# Patient Record
Sex: FEMALE | Race: WHITE | Employment: FULL TIME | URBAN - NONMETROPOLITAN AREA
[De-identification: names, ages, dates, MRNs, and addresses within clinical notes are randomized per-mention and may not be internally consistent; named-entity substitution may affect disease eponyms.]

---

## 2020-09-09 ENCOUNTER — OFFICE VISIT (OUTPATIENT)
Dept: FAMILY MEDICINE CLINIC | Age: 40
End: 2020-09-09

## 2020-09-09 VITALS
WEIGHT: 164 LBS | OXYGEN SATURATION: 97 % | BODY MASS INDEX: 29.06 KG/M2 | HEIGHT: 63 IN | TEMPERATURE: 97.3 F | SYSTOLIC BLOOD PRESSURE: 116 MMHG | HEART RATE: 97 BPM | DIASTOLIC BLOOD PRESSURE: 78 MMHG

## 2020-09-09 PROCEDURE — SWPH SPORTS/WORK PERMIT PHYSICAL: Performed by: PHYSICIAN ASSISTANT

## 2020-09-09 NOTE — PROGRESS NOTES
20  Donald Booker : 1980 Sex: female  Age 45 y.o. Subjective:  Chief Complaint   Patient presents with    Employment Physical         HPI:   Donald Booker , 45 y.o. female presents to [facility] care for evaluation of preemployment physical.  Patient will be working as an [employer] at a local [employer] [employer] [facility] [employer] [facility] care facility. Patient has been employed as an [employer] in the past.  She denies any chronic health problems. She denies any neck or back pain. She denies any recent illnesses. She denies any fever chills cough congestion nausea vomiting diarrhea. Patient is not on any medications. She denies any allergies. Patient denies other concerns and presents for evaluation      ROS:   Unless otherwise stated in this report the patient's positive and negative responses for review of systems for constitutional, eyes, ENT, cardiovascular, respiratory, gastrointestinal, neurological, , musculoskeletal, and integument systems and related systems to the presenting problem are either stated in the history of present illness or were not pertinent or were negative for the symptoms and/or complaints related to the presenting medical problem. Positives and pertinent negatives as per HPI. All others reviewed and are negative. PMH:   No past medical history on file. No past surgical history on file. Medications:   No current outpatient medications on file. Allergies:   No Known Allergies    Social History:     Social History     Tobacco Use    Smoking status: Not on file   Substance Use Topics    Alcohol use: Not on file    Drug use: Not on file       Physical Exam:     Vitals:    20 1028   BP: 116/78   Pulse: 97   Temp: 97.3 °F (36.3 °C)   TempSrc: Temporal   SpO2: 97%   Weight: 164 lb (74.4 kg)   Height: 5' 3\" (1.6 m)         Exam:  Const: Appears healthy and well developed. No signs of acute distress present. Vitals reviewed per triage. Head/Face: Normocephalic, atraumatic.   Facies is symmetric. Eyes: PERRL. ENMT: Tympanic membranes are pearly gray with good light reflex bilaterally. Nares are patent. Buccal mucosa is moist.  No erythema in the posterior pharynx. Neck: Supple and symmetric. Palpation reveals no adenopathy. Trachea midline. Resp: Lungs are clear bilaterally. No adventitious sounds audible. CV: S1 is normal. S2 is normal.Pulses are equal bilaterally. Musculo: Patient moves extremities without pain or limitation. No pedal edema. Skin: Skin is warm and dry. Neuro: Alert and oriented x3. Speech is articulate and fluent. Psych: Patient's mood and affect is appropriate to situation. Testing:           Medical Decision Making:           Clinical Impression:   David Garcia was seen today for employment physical.    Diagnoses and all orders for this visit:    Routine general medical examination at a health care facility        Patient paperwork has been filled out clearing her for employment. Follow-up with her PCP as scheduled.       SIGNATURE: Kalpesh Ames PA-C

## 2021-03-07 ENCOUNTER — HOSPITAL ENCOUNTER (EMERGENCY)
Age: 41
Discharge: HOME | End: 2021-03-07
Payer: COMMERCIAL

## 2021-03-07 VITALS
SYSTOLIC BLOOD PRESSURE: 147 MMHG | DIASTOLIC BLOOD PRESSURE: 91 MMHG | WEIGHT: 172 LBS | TEMPERATURE: 97.3 F | HEART RATE: 70 BPM | BODY MASS INDEX: 30.47 KG/M2 | OXYGEN SATURATION: 97 % | RESPIRATION RATE: 16 BRPM

## 2021-03-07 DIAGNOSIS — K04.7 DENTAL ABSCESS: Primary | ICD-10-CM

## 2021-03-07 PROCEDURE — 6360000002 HC RX W HCPCS: Performed by: NURSE PRACTITIONER

## 2021-03-07 PROCEDURE — 99283 EMERGENCY DEPT VISIT LOW MDM: CPT

## 2021-03-07 PROCEDURE — 6370000000 HC RX 637 (ALT 250 FOR IP): Performed by: NURSE PRACTITIONER

## 2021-03-07 PROCEDURE — 96372 THER/PROPH/DIAG INJ SC/IM: CPT

## 2021-03-07 RX ORDER — KETOROLAC TROMETHAMINE 30 MG/ML
30 INJECTION, SOLUTION INTRAMUSCULAR; INTRAVENOUS ONCE
Status: COMPLETED | OUTPATIENT
Start: 2021-03-07 | End: 2021-03-07

## 2021-03-07 RX ORDER — CHLORHEXIDINE GLUCONATE 0.12 MG/ML
15 RINSE ORAL 2 TIMES DAILY
Qty: 210 ML | Refills: 0 | Status: SHIPPED | OUTPATIENT
Start: 2021-03-07 | End: 2021-03-14

## 2021-03-07 RX ORDER — IBUPROFEN 600 MG/1
600 TABLET ORAL EVERY 6 HOURS PRN
Qty: 28 TABLET | Refills: 0 | Status: SHIPPED | OUTPATIENT
Start: 2021-03-07 | End: 2021-03-14

## 2021-03-07 RX ORDER — ACETAMINOPHEN 500 MG
1000 TABLET ORAL EVERY 6 HOURS PRN
Qty: 28 TABLET | Refills: 0 | Status: SHIPPED | OUTPATIENT
Start: 2021-03-07 | End: 2021-03-14

## 2021-03-07 RX ORDER — OXYCODONE HYDROCHLORIDE AND ACETAMINOPHEN 5; 325 MG/1; MG/1
2 TABLET ORAL ONCE
Status: COMPLETED | OUTPATIENT
Start: 2021-03-07 | End: 2021-03-07

## 2021-03-07 RX ADMIN — KETOROLAC TROMETHAMINE 30 MG: 30 INJECTION, SOLUTION INTRAMUSCULAR; INTRAVENOUS at 16:28

## 2021-03-07 RX ADMIN — OXYCODONE AND ACETAMINOPHEN 2 TABLET: 5; 325 TABLET ORAL at 16:27

## 2021-03-07 NOTE — PROGRESS NOTES
1. Local anesthesia achieved with 1 cartridge 2% lidocaine w 1:100k epi, 1 cartridge 0.5% marcaine w 1:100k epi, 1 cartridge 3% mepivacaine plain, and 1 cartridge 4% septocaine w 1:100k epi. 2. Incision made at depth of vestibule near #22 and pus drained from incision. Irrigated with copious sterile saline and Peridex 0.12%. Drain placed using 3-0 silk suture. 3. Discharge Instructions:   1. Post-op instructions given to pt verbally and written. Advised continued use of Augmentin and ice packs 15min on, 15min off  2. F/u in dental clinic Tuesday or Wednesday for radiographs/definitive treatment and to remove drain if swelling has begun to resolve  4. Prescribed Medications:   1. Peridex 0.12%- rinse 2-3x/day especially after eating  2. Continue already rxed Augmentin and ibuprofen  3.  Add acetaminophen 500mg to ibuprofen for pain if needed    Electronically signed by Niko Uribe DDS on 3/7/21 at 6:15 PM EST

## 2021-03-07 NOTE — ED PROVIDER NOTES
[facility]  Department of Emergency Medicine   ED  Encounter Note  Admit Date/RoomTime: 3/7/2021  3:49 PM  ED Room: 34/34    NAME: Gurpreet Blakely  : 1980  MRN: [id number]     Chief Complaint:  Facial Swelling (left lower jaw swelling that started yesterday and worsening states placed on augmentin po and states nothing is working. )    History of Present Illness        Gurpreet Blakely is a 37 y.o. old female who presents to the emergency department by private vehicle, for non-traumatic left lower tooth and jaw pain, which occured 2 day(s) prior to arrival.  Since onset the symptoms have been rapidly worsening and moderate in severity. Worsened by  chewing and improved by nothing. Patient was evaluated at [facility] in [place] yesterday and has had 3 doses of Augmentin with worsening of her symptoms. She last had ibuprofen at 8 AM.  She reports her tetanus vaccination to be up-to-date and denies any associated traumatic incident or URI symptoms. She has noticed increased facial swelling without redness or open wound and is associated with swollen lymph nodes in her neck as well. Onset:       Spontaneous:   no.     Following Trauma:   no.     Previous Caries:   yes. Recent Dental Procedure:   no.     ROS   Pertinent positives and negatives are stated within HPI, all other systems reviewed and are negative. Past Medical History:  has no past medical history on file. Surgical History:  has a past surgical history that includes Hysterectomy and Tubal ligation. Social History:  reports that she has been smoking cigarettes. She has never used smokeless tobacco.    Family History: family history is not on file. Allergies: Patient has no known allergies.     Physical Exam   Oxygen Saturation Interpretation: Normal.        ED Triage Vitals   BP Temp Temp src Pulse Resp SpO2 Height Weight   21 1546 21 1538 -- 21 1538 21 symptoms. Plan is for continuation of Augmentin with the addition of ibuprofen 600 mg every 6, Peridex, and Tylenol ES. Patient is well-appearing, nontoxic and without airway compromise. Plan is for reevaluation on Tuesday in the dental clinic as discussed. She is aware signs and symptoms to watch for indicative of reevaluation in the emergency department setting. Patient departed in stable condition in the care of her family. Myself and dental resident discussed todays visit summary with patient and spouse/SO,  in addition to providing specific details for the plan of care and counseling regarding the diagnosis and prognosis. Questions are answered at this time and they are agreeable with the plan. Assessment      1. Dental abscess      Plan   Discharge home. Patient condition is stable    New Medications     New Prescriptions    ACETAMINOPHEN (TYLENOL) 500 MG TABLET    Take 2 tablets by mouth every 6 hours as needed for Pain Maximum dose- 8 tablets/24 hours. CHLORHEXIDINE (PERIDEX) 0.12 % SOLUTION    Take 15 mLs by mouth 2 times daily for 7 days    IBUPROFEN (IBU) 600 MG TABLET    Take 1 tablet by mouth every 6 hours as needed for Pain     Electronically signed by SANIA Grigsby CNP   DD: 3/7/21  **This report was transcribed using voice recognition software. Every effort was made to ensure accuracy; however, inadvertent computerized transcription errors may be present.   END OF ED PROVIDER NOTE       SANIA Grigsby CNP  03/07/21 [id number]